# Patient Record
Sex: MALE | Race: WHITE | NOT HISPANIC OR LATINO | Employment: OTHER | URBAN - METROPOLITAN AREA
[De-identification: names, ages, dates, MRNs, and addresses within clinical notes are randomized per-mention and may not be internally consistent; named-entity substitution may affect disease eponyms.]

---

## 2021-03-03 DIAGNOSIS — Z23 NEED FOR VACCINATION: ICD-10-CM

## 2022-09-28 ENCOUNTER — PRE-ADMISSION TESTING (OUTPATIENT)
Dept: ADMISSIONS | Facility: MEDICAL CENTER | Age: 72
End: 2022-09-28
Attending: UROLOGY
Payer: MEDICARE

## 2022-09-28 DIAGNOSIS — Z01.812 PRE-OPERATIVE LABORATORY EXAMINATION: ICD-10-CM

## 2022-09-28 DIAGNOSIS — Z01.810 PRE-OPERATIVE CARDIOVASCULAR EXAMINATION: ICD-10-CM

## 2022-09-28 LAB
ANION GAP SERPL CALC-SCNC: 14 MMOL/L (ref 7–16)
APPEARANCE UR: CLEAR
BACTERIA #/AREA URNS HPF: NEGATIVE /HPF
BASOPHILS # BLD AUTO: 0.3 % (ref 0–1.8)
BASOPHILS # BLD: 0.02 K/UL (ref 0–0.12)
BILIRUB UR QL STRIP.AUTO: NEGATIVE
BUN SERPL-MCNC: 30 MG/DL (ref 8–22)
CALCIUM SERPL-MCNC: 9.1 MG/DL (ref 8.5–10.5)
CHLORIDE SERPL-SCNC: 101 MMOL/L (ref 96–112)
CO2 SERPL-SCNC: 20 MMOL/L (ref 20–33)
COLOR UR: YELLOW
CREAT SERPL-MCNC: 1.07 MG/DL (ref 0.5–1.4)
EKG IMPRESSION: NORMAL
EOSINOPHIL # BLD AUTO: 0.17 K/UL (ref 0–0.51)
EOSINOPHIL NFR BLD: 2.8 % (ref 0–6.9)
EPI CELLS #/AREA URNS HPF: NEGATIVE /HPF
ERYTHROCYTE [DISTWIDTH] IN BLOOD BY AUTOMATED COUNT: 44.6 FL (ref 35.9–50)
GFR SERPLBLD CREATININE-BSD FMLA CKD-EPI: 75 ML/MIN/1.73 M 2
GLUCOSE SERPL-MCNC: 94 MG/DL (ref 65–99)
GLUCOSE UR STRIP.AUTO-MCNC: NEGATIVE MG/DL
HCT VFR BLD AUTO: 47.9 % (ref 42–52)
HGB BLD-MCNC: 16.2 G/DL (ref 14–18)
HYALINE CASTS #/AREA URNS LPF: ABNORMAL /LPF
IMM GRANULOCYTES # BLD AUTO: 0.02 K/UL (ref 0–0.11)
IMM GRANULOCYTES NFR BLD AUTO: 0.3 % (ref 0–0.9)
INR PPP: 0.94 (ref 0.87–1.13)
KETONES UR STRIP.AUTO-MCNC: 40 MG/DL
LEUKOCYTE ESTERASE UR QL STRIP.AUTO: NEGATIVE
LYMPHOCYTES # BLD AUTO: 1.41 K/UL (ref 1–4.8)
LYMPHOCYTES NFR BLD: 23.2 % (ref 22–41)
MCH RBC QN AUTO: 27.7 PG (ref 27–33)
MCHC RBC AUTO-ENTMCNC: 33.8 G/DL (ref 33.7–35.3)
MCV RBC AUTO: 81.9 FL (ref 81.4–97.8)
MICRO URNS: ABNORMAL
MONOCYTES # BLD AUTO: 0.58 K/UL (ref 0–0.85)
MONOCYTES NFR BLD AUTO: 9.5 % (ref 0–13.4)
NEUTROPHILS # BLD AUTO: 3.89 K/UL (ref 1.82–7.42)
NEUTROPHILS NFR BLD: 63.9 % (ref 44–72)
NITRITE UR QL STRIP.AUTO: NEGATIVE
NRBC # BLD AUTO: 0 K/UL
NRBC BLD-RTO: 0 /100 WBC
PH UR STRIP.AUTO: 5.5 [PH] (ref 5–8)
PLATELET # BLD AUTO: 295 K/UL (ref 164–446)
PMV BLD AUTO: 10.3 FL (ref 9–12.9)
POTASSIUM SERPL-SCNC: 4.1 MMOL/L (ref 3.6–5.5)
PROT UR QL STRIP: NEGATIVE MG/DL
PROTHROMBIN TIME: 12.5 SEC (ref 12–14.6)
RBC # BLD AUTO: 5.85 M/UL (ref 4.7–6.1)
RBC # URNS HPF: ABNORMAL /HPF
RBC UR QL AUTO: ABNORMAL
SODIUM SERPL-SCNC: 135 MMOL/L (ref 135–145)
SP GR UR STRIP.AUTO: 1.03
UROBILINOGEN UR STRIP.AUTO-MCNC: 0.2 MG/DL
WBC # BLD AUTO: 6.1 K/UL (ref 4.8–10.8)
WBC #/AREA URNS HPF: ABNORMAL /HPF

## 2022-09-28 PROCEDURE — 93010 ELECTROCARDIOGRAM REPORT: CPT | Performed by: INTERNAL MEDICINE

## 2022-09-28 PROCEDURE — 93005 ELECTROCARDIOGRAM TRACING: CPT

## 2022-09-28 PROCEDURE — 85610 PROTHROMBIN TIME: CPT

## 2022-09-28 PROCEDURE — 87086 URINE CULTURE/COLONY COUNT: CPT

## 2022-09-28 PROCEDURE — 81001 URINALYSIS AUTO W/SCOPE: CPT

## 2022-09-28 PROCEDURE — 80048 BASIC METABOLIC PNL TOTAL CA: CPT

## 2022-09-28 PROCEDURE — 85025 COMPLETE CBC W/AUTO DIFF WBC: CPT

## 2022-09-28 PROCEDURE — 36415 COLL VENOUS BLD VENIPUNCTURE: CPT

## 2022-09-28 RX ORDER — OMEPRAZOLE 20 MG/1
20 CAPSULE, DELAYED RELEASE ORAL DAILY
COMMUNITY

## 2022-09-28 RX ORDER — VALACYCLOVIR HYDROCHLORIDE 500 MG/1
500 TABLET, FILM COATED ORAL 2 TIMES DAILY PRN
COMMUNITY

## 2022-09-28 RX ORDER — LISINOPRIL 10 MG/1
10 TABLET ORAL DAILY
COMMUNITY

## 2022-09-28 RX ORDER — TADALAFIL 10 MG/1
10 TABLET ORAL
COMMUNITY

## 2022-09-28 RX ORDER — TAMSULOSIN HYDROCHLORIDE 0.4 MG/1
0.4 CAPSULE ORAL
COMMUNITY

## 2022-09-28 RX ORDER — MELOXICAM 7.5 MG/1
7.5 TABLET ORAL PRN
COMMUNITY

## 2022-09-30 LAB
BACTERIA UR CULT: NORMAL
SIGNIFICANT IND 70042: NORMAL
SITE SITE: NORMAL
SOURCE SOURCE: NORMAL

## 2022-10-13 ENCOUNTER — ANESTHESIA EVENT (OUTPATIENT)
Dept: SURGERY | Facility: MEDICAL CENTER | Age: 72
End: 2022-10-13
Payer: MEDICARE

## 2022-10-13 ENCOUNTER — ANESTHESIA (OUTPATIENT)
Dept: SURGERY | Facility: MEDICAL CENTER | Age: 72
End: 2022-10-13
Payer: MEDICARE

## 2022-10-13 ENCOUNTER — HOSPITAL ENCOUNTER (OUTPATIENT)
Facility: MEDICAL CENTER | Age: 72
End: 2022-10-14
Attending: UROLOGY | Admitting: UROLOGY
Payer: MEDICARE

## 2022-10-13 ENCOUNTER — APPOINTMENT (OUTPATIENT)
Dept: RADIOLOGY | Facility: MEDICAL CENTER | Age: 72
End: 2022-10-13
Attending: UROLOGY
Payer: MEDICARE

## 2022-10-13 PROBLEM — N40.1 BPH WITH OBSTRUCTION/LOWER URINARY TRACT SYMPTOMS: Status: ACTIVE | Noted: 2022-10-13

## 2022-10-13 PROBLEM — N13.8 BPH WITH OBSTRUCTION/LOWER URINARY TRACT SYMPTOMS: Status: ACTIVE | Noted: 2022-10-13

## 2022-10-13 PROCEDURE — C1894 INTRO/SHEATH, NON-LASER: HCPCS | Performed by: UROLOGY

## 2022-10-13 PROCEDURE — 160002 HCHG RECOVERY MINUTES (STAT): Performed by: UROLOGY

## 2022-10-13 PROCEDURE — 160039 HCHG SURGERY MINUTES - EA ADDL 1 MIN LEVEL 3: Performed by: UROLOGY

## 2022-10-13 PROCEDURE — 160036 HCHG PACU - EA ADDL 30 MINS PHASE I: Performed by: UROLOGY

## 2022-10-13 PROCEDURE — A9270 NON-COVERED ITEM OR SERVICE: HCPCS | Performed by: UROLOGY

## 2022-10-13 PROCEDURE — 700111 HCHG RX REV CODE 636 W/ 250 OVERRIDE (IP): Performed by: UROLOGY

## 2022-10-13 PROCEDURE — C1769 GUIDE WIRE: HCPCS | Performed by: UROLOGY

## 2022-10-13 PROCEDURE — 88305 TISSUE EXAM BY PATHOLOGIST: CPT

## 2022-10-13 PROCEDURE — 700111 HCHG RX REV CODE 636 W/ 250 OVERRIDE (IP): Performed by: ANESTHESIOLOGY

## 2022-10-13 PROCEDURE — 700117 HCHG RX CONTRAST REV CODE 255: Performed by: UROLOGY

## 2022-10-13 PROCEDURE — 160009 HCHG ANES TIME/MIN: Performed by: UROLOGY

## 2022-10-13 PROCEDURE — 160028 HCHG SURGERY MINUTES - 1ST 30 MINS LEVEL 3: Performed by: UROLOGY

## 2022-10-13 PROCEDURE — 88342 IMHCHEM/IMCYTCHM 1ST ANTB: CPT

## 2022-10-13 PROCEDURE — G0378 HOSPITAL OBSERVATION PER HR: HCPCS

## 2022-10-13 PROCEDURE — 700102 HCHG RX REV CODE 250 W/ 637 OVERRIDE(OP): Performed by: UROLOGY

## 2022-10-13 PROCEDURE — 700101 HCHG RX REV CODE 250: Performed by: ANESTHESIOLOGY

## 2022-10-13 PROCEDURE — C1758 CATHETER, URETERAL: HCPCS | Performed by: UROLOGY

## 2022-10-13 PROCEDURE — 88341 IMHCHEM/IMCYTCHM EA ADD ANTB: CPT

## 2022-10-13 PROCEDURE — C2617 STENT, NON-COR, TEM W/O DEL: HCPCS | Performed by: UROLOGY

## 2022-10-13 PROCEDURE — A9270 NON-COVERED ITEM OR SERVICE: HCPCS | Performed by: ANESTHESIOLOGY

## 2022-10-13 PROCEDURE — 00912 ANES TRURL PX RESCJ BLDR TUM: CPT | Performed by: ANESTHESIOLOGY

## 2022-10-13 PROCEDURE — 700101 HCHG RX REV CODE 250: Performed by: UROLOGY

## 2022-10-13 PROCEDURE — 160048 HCHG OR STATISTICAL LEVEL 1-5: Performed by: UROLOGY

## 2022-10-13 PROCEDURE — 160035 HCHG PACU - 1ST 60 MINS PHASE I: Performed by: UROLOGY

## 2022-10-13 PROCEDURE — 96365 THER/PROPH/DIAG IV INF INIT: CPT

## 2022-10-13 PROCEDURE — 700105 HCHG RX REV CODE 258: Performed by: UROLOGY

## 2022-10-13 PROCEDURE — 88300 SURGICAL PATH GROSS: CPT

## 2022-10-13 PROCEDURE — 700102 HCHG RX REV CODE 250 W/ 637 OVERRIDE(OP): Performed by: ANESTHESIOLOGY

## 2022-10-13 DEVICE — STENT UROLOGICAL POLARIS 6X26  ULTRA: Type: IMPLANTABLE DEVICE | Status: FUNCTIONAL

## 2022-10-13 RX ORDER — LIDOCAINE HYDROCHLORIDE 20 MG/ML
INJECTION, SOLUTION EPIDURAL; INFILTRATION; INTRACAUDAL; PERINEURAL PRN
Status: DISCONTINUED | OUTPATIENT
Start: 2022-10-13 | End: 2022-10-13 | Stop reason: SURG

## 2022-10-13 RX ORDER — DIPHENHYDRAMINE HYDROCHLORIDE 50 MG/ML
12.5 INJECTION INTRAMUSCULAR; INTRAVENOUS
Status: DISCONTINUED | OUTPATIENT
Start: 2022-10-13 | End: 2022-10-13 | Stop reason: HOSPADM

## 2022-10-13 RX ORDER — ONDANSETRON 2 MG/ML
4 INJECTION INTRAMUSCULAR; INTRAVENOUS EVERY 4 HOURS PRN
Status: DISCONTINUED | OUTPATIENT
Start: 2022-10-13 | End: 2022-10-14 | Stop reason: HOSPADM

## 2022-10-13 RX ORDER — CEFAZOLIN SODIUM 1 G/50ML
1 INJECTION, SOLUTION INTRAVENOUS EVERY 8 HOURS
Status: COMPLETED | OUTPATIENT
Start: 2022-10-14 | End: 2022-10-14

## 2022-10-13 RX ORDER — ATROPA BELLADONNA AND OPIUM 16.2; 6 MG/1; MG/1
60 SUPPOSITORY RECTAL EVERY 12 HOURS PRN
Status: DISCONTINUED | OUTPATIENT
Start: 2022-10-13 | End: 2022-10-14 | Stop reason: HOSPADM

## 2022-10-13 RX ORDER — DEXAMETHASONE SODIUM PHOSPHATE 4 MG/ML
4 INJECTION, SOLUTION INTRA-ARTICULAR; INTRALESIONAL; INTRAMUSCULAR; INTRAVENOUS; SOFT TISSUE
Status: DISCONTINUED | OUTPATIENT
Start: 2022-10-13 | End: 2022-10-14 | Stop reason: HOSPADM

## 2022-10-13 RX ORDER — ACETAMINOPHEN 500 MG
1000 TABLET ORAL ONCE
Status: COMPLETED | OUTPATIENT
Start: 2022-10-13 | End: 2022-10-13

## 2022-10-13 RX ORDER — OMEPRAZOLE 20 MG/1
20 CAPSULE, DELAYED RELEASE ORAL DAILY
Status: DISCONTINUED | OUTPATIENT
Start: 2022-10-14 | End: 2022-10-14 | Stop reason: HOSPADM

## 2022-10-13 RX ORDER — ALBUTEROL SULFATE 2.5 MG/3ML
2.5 SOLUTION RESPIRATORY (INHALATION)
Status: DISCONTINUED | OUTPATIENT
Start: 2022-10-13 | End: 2022-10-13 | Stop reason: HOSPADM

## 2022-10-13 RX ORDER — MAGNESIUM HYDROXIDE 1200 MG/15ML
LIQUID ORAL
Status: COMPLETED | OUTPATIENT
Start: 2022-10-13 | End: 2022-10-13

## 2022-10-13 RX ORDER — ATROPA BELLADONNA AND OPIUM 16.2; 6 MG/1; MG/1
SUPPOSITORY RECTAL
Status: DISCONTINUED | OUTPATIENT
Start: 2022-10-13 | End: 2022-10-13 | Stop reason: HOSPADM

## 2022-10-13 RX ORDER — KETOROLAC TROMETHAMINE 30 MG/ML
INJECTION, SOLUTION INTRAMUSCULAR; INTRAVENOUS PRN
Status: DISCONTINUED | OUTPATIENT
Start: 2022-10-13 | End: 2022-10-13 | Stop reason: HOSPADM

## 2022-10-13 RX ORDER — LISINOPRIL 10 MG/1
10 TABLET ORAL DAILY
Status: DISCONTINUED | OUTPATIENT
Start: 2022-10-14 | End: 2022-10-14 | Stop reason: HOSPADM

## 2022-10-13 RX ORDER — HYDROMORPHONE HYDROCHLORIDE 1 MG/ML
0.1 INJECTION, SOLUTION INTRAMUSCULAR; INTRAVENOUS; SUBCUTANEOUS
Status: DISCONTINUED | OUTPATIENT
Start: 2022-10-13 | End: 2022-10-13 | Stop reason: HOSPADM

## 2022-10-13 RX ORDER — SODIUM CHLORIDE, SODIUM LACTATE, POTASSIUM CHLORIDE, CALCIUM CHLORIDE 600; 310; 30; 20 MG/100ML; MG/100ML; MG/100ML; MG/100ML
INJECTION, SOLUTION INTRAVENOUS CONTINUOUS
Status: DISCONTINUED | OUTPATIENT
Start: 2022-10-13 | End: 2022-10-13 | Stop reason: HOSPADM

## 2022-10-13 RX ORDER — ACETAMINOPHEN 500 MG
1000 TABLET ORAL ONCE
Status: DISCONTINUED | OUTPATIENT
Start: 2022-10-13 | End: 2022-10-13 | Stop reason: HOSPADM

## 2022-10-13 RX ORDER — HYDROMORPHONE HYDROCHLORIDE 1 MG/ML
0.25 INJECTION, SOLUTION INTRAMUSCULAR; INTRAVENOUS; SUBCUTANEOUS
Status: DISCONTINUED | OUTPATIENT
Start: 2022-10-13 | End: 2022-10-14 | Stop reason: HOSPADM

## 2022-10-13 RX ORDER — ONDANSETRON 2 MG/ML
4 INJECTION INTRAMUSCULAR; INTRAVENOUS
Status: DISCONTINUED | OUTPATIENT
Start: 2022-10-13 | End: 2022-10-13 | Stop reason: HOSPADM

## 2022-10-13 RX ORDER — ONDANSETRON 2 MG/ML
INJECTION INTRAMUSCULAR; INTRAVENOUS PRN
Status: DISCONTINUED | OUTPATIENT
Start: 2022-10-13 | End: 2022-10-13 | Stop reason: SURG

## 2022-10-13 RX ORDER — ATROPA BELLADONNA AND OPIUM 16.2; 6 MG/1; MG/1
SUPPOSITORY RECTAL
Status: DISCONTINUED | OUTPATIENT
Start: 2022-10-13 | End: 2022-10-13

## 2022-10-13 RX ORDER — GLYCOPYRROLATE 0.2 MG/ML
INJECTION INTRAMUSCULAR; INTRAVENOUS PRN
Status: DISCONTINUED | OUTPATIENT
Start: 2022-10-13 | End: 2022-10-13 | Stop reason: SURG

## 2022-10-13 RX ORDER — OXYCODONE HYDROCHLORIDE 5 MG/1
5 TABLET ORAL
Status: DISCONTINUED | OUTPATIENT
Start: 2022-10-13 | End: 2022-10-14 | Stop reason: HOSPADM

## 2022-10-13 RX ORDER — HALOPERIDOL 5 MG/ML
1 INJECTION INTRAMUSCULAR EVERY 6 HOURS PRN
Status: DISCONTINUED | OUTPATIENT
Start: 2022-10-13 | End: 2022-10-14 | Stop reason: HOSPADM

## 2022-10-13 RX ORDER — CEFAZOLIN SODIUM 1 G/3ML
INJECTION, POWDER, FOR SOLUTION INTRAMUSCULAR; INTRAVENOUS PRN
Status: DISCONTINUED | OUTPATIENT
Start: 2022-10-13 | End: 2022-10-13 | Stop reason: SURG

## 2022-10-13 RX ORDER — HYDROMORPHONE HYDROCHLORIDE 1 MG/ML
0.4 INJECTION, SOLUTION INTRAMUSCULAR; INTRAVENOUS; SUBCUTANEOUS
Status: DISCONTINUED | OUTPATIENT
Start: 2022-10-13 | End: 2022-10-13 | Stop reason: HOSPADM

## 2022-10-13 RX ORDER — AMOXICILLIN 250 MG
2 CAPSULE ORAL
Status: DISCONTINUED | OUTPATIENT
Start: 2022-10-13 | End: 2022-10-14 | Stop reason: HOSPADM

## 2022-10-13 RX ORDER — SODIUM CHLORIDE, SODIUM LACTATE, POTASSIUM CHLORIDE, CALCIUM CHLORIDE 600; 310; 30; 20 MG/100ML; MG/100ML; MG/100ML; MG/100ML
INJECTION, SOLUTION INTRAVENOUS CONTINUOUS
Status: ACTIVE | OUTPATIENT
Start: 2022-10-13 | End: 2022-10-13

## 2022-10-13 RX ORDER — DIPHENHYDRAMINE HYDROCHLORIDE 50 MG/ML
25 INJECTION INTRAMUSCULAR; INTRAVENOUS EVERY 6 HOURS PRN
Status: DISCONTINUED | OUTPATIENT
Start: 2022-10-13 | End: 2022-10-14 | Stop reason: HOSPADM

## 2022-10-13 RX ORDER — DEXAMETHASONE SODIUM PHOSPHATE 4 MG/ML
INJECTION, SOLUTION INTRA-ARTICULAR; INTRALESIONAL; INTRAMUSCULAR; INTRAVENOUS; SOFT TISSUE PRN
Status: DISCONTINUED | OUTPATIENT
Start: 2022-10-13 | End: 2022-10-13 | Stop reason: SURG

## 2022-10-13 RX ORDER — ACETAMINOPHEN 500 MG
1000 TABLET ORAL EVERY 6 HOURS PRN
Status: DISCONTINUED | OUTPATIENT
Start: 2022-10-18 | End: 2022-10-14 | Stop reason: HOSPADM

## 2022-10-13 RX ORDER — HYDRALAZINE HYDROCHLORIDE 20 MG/ML
5 INJECTION INTRAMUSCULAR; INTRAVENOUS
Status: DISCONTINUED | OUTPATIENT
Start: 2022-10-13 | End: 2022-10-13 | Stop reason: HOSPADM

## 2022-10-13 RX ORDER — OXYCODONE HYDROCHLORIDE 5 MG/1
2.5 TABLET ORAL
Status: DISCONTINUED | OUTPATIENT
Start: 2022-10-13 | End: 2022-10-14 | Stop reason: HOSPADM

## 2022-10-13 RX ORDER — PHENAZOPYRIDINE HYDROCHLORIDE 200 MG/1
200 TABLET, FILM COATED ORAL
Status: DISCONTINUED | OUTPATIENT
Start: 2022-10-14 | End: 2022-10-14 | Stop reason: HOSPADM

## 2022-10-13 RX ORDER — SODIUM CHLORIDE 9 MG/ML
INJECTION, SOLUTION INTRAVENOUS CONTINUOUS
Status: ACTIVE | OUTPATIENT
Start: 2022-10-13 | End: 2022-10-14

## 2022-10-13 RX ORDER — LABETALOL HYDROCHLORIDE 5 MG/ML
5 INJECTION, SOLUTION INTRAVENOUS
Status: DISCONTINUED | OUTPATIENT
Start: 2022-10-13 | End: 2022-10-13 | Stop reason: HOSPADM

## 2022-10-13 RX ORDER — HALOPERIDOL 5 MG/ML
1 INJECTION INTRAMUSCULAR
Status: DISCONTINUED | OUTPATIENT
Start: 2022-10-13 | End: 2022-10-13 | Stop reason: HOSPADM

## 2022-10-13 RX ORDER — ATROPA BELLADONNA AND OPIUM 16.2; 6 MG/1; MG/1
1 SUPPOSITORY RECTAL
Status: DISCONTINUED | OUTPATIENT
Start: 2022-10-13 | End: 2022-10-13

## 2022-10-13 RX ORDER — HYDROMORPHONE HYDROCHLORIDE 1 MG/ML
0.2 INJECTION, SOLUTION INTRAMUSCULAR; INTRAVENOUS; SUBCUTANEOUS
Status: DISCONTINUED | OUTPATIENT
Start: 2022-10-13 | End: 2022-10-13 | Stop reason: HOSPADM

## 2022-10-13 RX ORDER — SIMETHICONE 125 MG
125 TABLET,CHEWABLE ORAL 3 TIMES DAILY PRN
Status: DISCONTINUED | OUTPATIENT
Start: 2022-10-13 | End: 2022-10-14 | Stop reason: HOSPADM

## 2022-10-13 RX ORDER — SCOLOPAMINE TRANSDERMAL SYSTEM 1 MG/1
1 PATCH, EXTENDED RELEASE TRANSDERMAL
Status: DISCONTINUED | OUTPATIENT
Start: 2022-10-13 | End: 2022-10-14 | Stop reason: HOSPADM

## 2022-10-13 RX ORDER — ACETAMINOPHEN 325 MG/1
650 TABLET ORAL EVERY 6 HOURS PRN
Status: DISCONTINUED | OUTPATIENT
Start: 2022-10-13 | End: 2022-10-14 | Stop reason: HOSPADM

## 2022-10-13 RX ORDER — HYDROXYZINE HYDROCHLORIDE 25 MG/1
25 TABLET, FILM COATED ORAL NIGHTLY PRN
Status: DISCONTINUED | OUTPATIENT
Start: 2022-10-13 | End: 2022-10-14 | Stop reason: HOSPADM

## 2022-10-13 RX ADMIN — GLYCOPYRROLATE 0.3 MG: 0.2 INJECTION INTRAMUSCULAR; INTRAVENOUS at 15:58

## 2022-10-13 RX ADMIN — FENTANYL CITRATE 50 MCG: 50 INJECTION, SOLUTION INTRAMUSCULAR; INTRAVENOUS at 16:25

## 2022-10-13 RX ADMIN — FENTANYL CITRATE 50 MCG: 50 INJECTION, SOLUTION INTRAMUSCULAR; INTRAVENOUS at 15:52

## 2022-10-13 RX ADMIN — DEXAMETHASONE SODIUM PHOSPHATE 8 MG: 4 INJECTION, SOLUTION INTRA-ARTICULAR; INTRALESIONAL; INTRAMUSCULAR; INTRAVENOUS; SOFT TISSUE at 15:31

## 2022-10-13 RX ADMIN — LIDOCAINE HYDROCHLORIDE 50 MG: 20 INJECTION, SOLUTION EPIDURAL; INFILTRATION; INTRACAUDAL at 15:30

## 2022-10-13 RX ADMIN — KETOROLAC TROMETHAMINE 15 MG: 30 INJECTION, SOLUTION INTRAMUSCULAR at 17:16

## 2022-10-13 RX ADMIN — FENTANYL CITRATE 50 MCG: 50 INJECTION, SOLUTION INTRAMUSCULAR; INTRAVENOUS at 15:30

## 2022-10-13 RX ADMIN — SENNOSIDES AND DOCUSATE SODIUM 2 TABLET: 50; 8.6 TABLET ORAL at 21:20

## 2022-10-13 RX ADMIN — CEFAZOLIN 2 G: 330 INJECTION, POWDER, FOR SOLUTION INTRAMUSCULAR; INTRAVENOUS at 15:31

## 2022-10-13 RX ADMIN — PROPOFOL 150 MG: 10 INJECTION, EMULSION INTRAVENOUS at 15:30

## 2022-10-13 RX ADMIN — FENTANYL CITRATE 50 MCG: 50 INJECTION, SOLUTION INTRAMUSCULAR; INTRAVENOUS at 17:12

## 2022-10-13 RX ADMIN — SODIUM CHLORIDE: 9 INJECTION, SOLUTION INTRAVENOUS at 21:20

## 2022-10-13 RX ADMIN — ACETAMINOPHEN 1000 MG: 500 TABLET ORAL at 14:52

## 2022-10-13 RX ADMIN — SODIUM CHLORIDE, POTASSIUM CHLORIDE, SODIUM LACTATE AND CALCIUM CHLORIDE: 600; 310; 30; 20 INJECTION, SOLUTION INTRAVENOUS at 14:47

## 2022-10-13 RX ADMIN — CEFAZOLIN SODIUM 1 G: 1 INJECTION, SOLUTION INTRAVENOUS at 23:33

## 2022-10-13 RX ADMIN — ONDANSETRON 4 MG: 2 INJECTION INTRAMUSCULAR; INTRAVENOUS at 17:16

## 2022-10-13 RX ADMIN — FENTANYL CITRATE 50 MCG: 50 INJECTION, SOLUTION INTRAMUSCULAR; INTRAVENOUS at 17:03

## 2022-10-13 ASSESSMENT — COGNITIVE AND FUNCTIONAL STATUS - GENERAL
CLIMB 3 TO 5 STEPS WITH RAILING: A LITTLE
SUGGESTED CMS G CODE MODIFIER MOBILITY: CJ
TOILETING: A LITTLE
STANDING UP FROM CHAIR USING ARMS: A LITTLE
MOVING FROM LYING ON BACK TO SITTING ON SIDE OF FLAT BED: A LITTLE
WALKING IN HOSPITAL ROOM: A LITTLE
DAILY ACTIVITIY SCORE: 23
SUGGESTED CMS G CODE MODIFIER DAILY ACTIVITY: CI
MOBILITY SCORE: 20

## 2022-10-13 ASSESSMENT — PAIN SCALES - GENERAL: PAIN_LEVEL: 0

## 2022-10-13 ASSESSMENT — PATIENT HEALTH QUESTIONNAIRE - PHQ9
2. FEELING DOWN, DEPRESSED, IRRITABLE, OR HOPELESS: NOT AT ALL
SUM OF ALL RESPONSES TO PHQ9 QUESTIONS 1 AND 2: 0
1. LITTLE INTEREST OR PLEASURE IN DOING THINGS: NOT AT ALL

## 2022-10-13 ASSESSMENT — PAIN DESCRIPTION - PAIN TYPE
TYPE: SURGICAL PAIN
TYPE: SURGICAL PAIN;REFERRED PAIN
TYPE: SURGICAL PAIN
TYPE: ACUTE PAIN
TYPE: SURGICAL PAIN

## 2022-10-13 NOTE — ANESTHESIA PROCEDURE NOTES
Airway    Date/Time: 10/13/2022 3:30 PM  Performed by: Elder Weinstein M.D.  Authorized by: Elder Weinstein M.D.     Location:  OR  Urgency:  Elective  Indications for Airway Management:  Anesthesia      Spontaneous Ventilation: absent    Sedation Level:  Deep  Preoxygenated: Yes    Final Airway Type:  Supraglottic airway  Final Supraglottic Airway:  Standard LMA    SGA Size:  4  Number of Attempts at Approach:  1

## 2022-10-13 NOTE — ANESTHESIA PREPROCEDURE EVALUATION
Case: [id number] Date/Time: 10/13/22 1545    Procedures:       CYSTOLITHOLAPAXY WITH BIPOLAR TRANSURETHRAL RESECTION OF PROSTATE AND CYSTOSCOPY WITH RIGHT URETEROSCOPY WITH LITHOTRIPSY WITH INSERTION OF RIGHT URETERAL STENT      LITHOTRIPSY, USING LASER      TURP, USING BUTTON ELECTRODE      URETEROSCOPY    Pre-op diagnosis: URINARY BLADDER STONE    Location: [facility] OR 18 / SURGERY [facility]    Surgeons: Yusuf Hernández M.D.          Relevant Problems   ANESTHESIA (within normal limits)      PULMONARY (within normal limits)      NEURO (within normal limits)      CARDIAC   (positive) Hypertension      GI (within normal limits)       (within normal limits)      ENDO (within normal limits)       Physical Exam    Airway   Mallampati: II  TM distance: >3 FB  Neck ROM: full       Cardiovascular - normal exam  Rhythm: regular  Rate: normal  (-) murmur     Dental - normal exam           Pulmonary - normal exam  Breath sounds clear to auscultation     Abdominal    Neurological - normal exam                 Anesthesia Plan    ASA 2       Plan - general       Airway plan will be LMA          Induction: intravenous    Postoperative Plan: Postoperative administration of opioids is intended.    Pertinent diagnostic labs and testing reviewed    Informed Consent:    Anesthetic plan and risks discussed with patient.    Use of blood products discussed with: patient whom consented to blood products.

## 2022-10-14 VITALS
TEMPERATURE: 97.7 F | RESPIRATION RATE: 18 BRPM | BODY MASS INDEX: 23.76 KG/M2 | SYSTOLIC BLOOD PRESSURE: 126 MMHG | DIASTOLIC BLOOD PRESSURE: 81 MMHG | HEART RATE: 48 BPM | OXYGEN SATURATION: 100 % | HEIGHT: 76 IN | WEIGHT: 195.11 LBS

## 2022-10-14 PROBLEM — N21.0 CALCULUS IN BLADDER: Status: ACTIVE | Noted: 2022-10-14

## 2022-10-14 LAB
ANION GAP SERPL CALC-SCNC: 16 MMOL/L (ref 7–16)
BUN SERPL-MCNC: 27 MG/DL (ref 8–22)
CALCIUM SERPL-MCNC: 9.1 MG/DL (ref 8.5–10.5)
CHLORIDE SERPL-SCNC: 96 MMOL/L (ref 96–112)
CO2 SERPL-SCNC: 20 MMOL/L (ref 20–33)
CREAT SERPL-MCNC: 1.4 MG/DL (ref 0.5–1.4)
ERYTHROCYTE [DISTWIDTH] IN BLOOD BY AUTOMATED COUNT: 47.6 FL (ref 35.9–50)
GFR SERPLBLD CREATININE-BSD FMLA CKD-EPI: 54 ML/MIN/1.73 M 2
GLUCOSE SERPL-MCNC: 109 MG/DL (ref 65–99)
HCT VFR BLD AUTO: 48.8 % (ref 42–52)
HGB BLD-MCNC: 16 G/DL (ref 14–18)
MCH RBC QN AUTO: 27.9 PG (ref 27–33)
MCHC RBC AUTO-ENTMCNC: 32.8 G/DL (ref 33.7–35.3)
MCV RBC AUTO: 85 FL (ref 81.4–97.8)
PLATELET # BLD AUTO: 298 K/UL (ref 164–446)
PMV BLD AUTO: 9.7 FL (ref 9–12.9)
POTASSIUM SERPL-SCNC: 5.1 MMOL/L (ref 3.6–5.5)
RBC # BLD AUTO: 5.74 M/UL (ref 4.7–6.1)
SODIUM SERPL-SCNC: 132 MMOL/L (ref 135–145)
WBC # BLD AUTO: 7.4 K/UL (ref 4.8–10.8)

## 2022-10-14 PROCEDURE — A9270 NON-COVERED ITEM OR SERVICE: HCPCS | Performed by: UROLOGY

## 2022-10-14 PROCEDURE — 85027 COMPLETE CBC AUTOMATED: CPT

## 2022-10-14 PROCEDURE — 36415 COLL VENOUS BLD VENIPUNCTURE: CPT

## 2022-10-14 PROCEDURE — 96366 THER/PROPH/DIAG IV INF ADDON: CPT

## 2022-10-14 PROCEDURE — 80048 BASIC METABOLIC PNL TOTAL CA: CPT

## 2022-10-14 PROCEDURE — 700102 HCHG RX REV CODE 250 W/ 637 OVERRIDE(OP): Performed by: UROLOGY

## 2022-10-14 PROCEDURE — G0378 HOSPITAL OBSERVATION PER HR: HCPCS

## 2022-10-14 PROCEDURE — 700111 HCHG RX REV CODE 636 W/ 250 OVERRIDE (IP): Performed by: UROLOGY

## 2022-10-14 RX ADMIN — LISINOPRIL 10 MG: 10 TABLET ORAL at 05:36

## 2022-10-14 RX ADMIN — CEFAZOLIN SODIUM 1 G: 1 INJECTION, SOLUTION INTRAVENOUS at 05:36

## 2022-10-14 RX ADMIN — PHENAZOPYRIDINE HYDROCHLORIDE 200 MG: 200 TABLET ORAL at 18:02

## 2022-10-14 RX ADMIN — PHENAZOPYRIDINE HYDROCHLORIDE 200 MG: 200 TABLET ORAL at 08:50

## 2022-10-14 RX ADMIN — OMEPRAZOLE 20 MG: 20 CAPSULE, DELAYED RELEASE ORAL at 05:36

## 2022-10-14 RX ADMIN — PHENAZOPYRIDINE HYDROCHLORIDE 200 MG: 200 TABLET ORAL at 18:07

## 2022-10-14 RX ADMIN — PHENAZOPYRIDINE HYDROCHLORIDE 200 MG: 200 TABLET ORAL at 12:53

## 2022-10-14 NOTE — ANESTHESIA POSTPROCEDURE EVALUATION
Patient: Yusuf Smallwood    Procedure Summary     Date: 10/13/22 Room / Location: [place] [address] / SURGERY [facility]    Anesthesia Start: 1525 Anesthesia Stop: 1728    Procedures:       CYSTOLITHOLAPAXY WITH BIPOLAR TRANSURETHRAL RESECTION OF PROSTATE AND CYSTOSCOPY WITH RIGHT URETEROSCOPY WITH LITHOTRIPSY WITH INSERTION OF RIGHT URETERAL STENT (Right: Urethra)      LITHOTRIPSY, USING LASER (Right: Ureter)      TURP, USING BUTTON ELECTRODE (Urethra)      URETEROSCOPY (Right: Ureter) Diagnosis: (URINARY BLADDER STONE)    Surgeons: Yusuf Hernández M.D. Responsible Provider: Elder Weinstein M.D.    Anesthesia Type: general ASA Status: 2          Final Anesthesia Type: general  Last vitals  BP   Blood Pressure : 136/78    Temp   35.8 °C (96.5 °F)    Pulse   (!) 52   Resp   16    SpO2   96 %      Anesthesia Post Evaluation    Patient location during evaluation: PACU  Patient participation: complete - patient participated  Level of consciousness: awake and alert  Pain score: 0    Airway patency: patent  Anesthetic complications: no  Cardiovascular status: hemodynamically stable  Respiratory status: acceptable  Hydration status: euvolemic    PONV: none          No notable events documented.     Nurse Pain Score: 0 (NPRS)

## 2022-10-14 NOTE — OP REPORT
[facility] Operative Report    Pre-operative Diagnosis: 1.  BPH    2.  Bladder calculus    3.  Possible right renal calculus   Post-operative Diagnosis: 1.  BPH    2.  Bladder calculus    3.  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  Transurethral resection of prostate    2.  Cystolitholopaxy (laser), 1 cm bladder stone    3.  Right ureteroscopy with right ureteral stent placement   Surgeon: Yusuf Hernández M.D., MD   Assistant: None   Anesthesia: General  Anesthesiologist: Elder Weinstein M.D.    Estimated Blood Loss: Approximately 100 cc   IV fluids ? cc crystalloid   Specimens: 1.  Bladder calculus    2.  Prostate chips   Drains: 1.  6 Ivorian x26 cm double-J ureteral stent (no string)    2.  20 Ivorian three-way Castillo catheter with normal saline continuous bladder irrigation   Complications: None   Wound class Clean contaminated   Condition: Stable, procedure well tolerated    Disposition:  Stable to PACU    Findings: 1.  Marked BPH    2.  1 cm bladder calculus, fragmented with laser and remove    3.  No evidence of renal calculus although each calyx had multiple Turner's plaques; right proximal ureteral medial wall thinned with dilation from access catheter so ureteral stent left in place (no string)     Indications for Procedure:  Yusuf Smallwood is a 71 y.o. male with symptomatic BPH on medical therapy, bladder calculus, and concern on renal ultrasound of a 5 mm renal calculus.  Renal ultrasound was performed at [facility].  He is taken now for definitive treatment    Procedure in Detail:  The patient was identified in the holding area.  He was taken to the operative suite.  General anesthesia was administered by Dr. Weinstein.  He was positioned in the dorsal lithotomy position and sterilely prepped and draped.  Timeout was called.  Correct patient and site of surgery was confirmed.  The operating team agreed.  Cefazolin was given  intravenously for antibiotic prophylaxis.    The urethral meatus and distal urethra was dilated with sequential male dilating sounds from 22 Italian to 30 Italian.  A 22 Italian cystoscope was introduced.  No urethral stricturing was noted.  Enlarged prostate with an elevated median bar was noted.  Upon entering the bladder a lees yellow bladder calculus was in the dependent portion of the bladder.  Ureteral orifice ease were close to the enlarged prostate.  No foreign bodies or tumors were noted within the bladder.    A 6 Italian open-ended catheter was seated into the right ureteral orifice and retrograde pyelogram outlining normal-appearing right ureter and kidney.  There was some tortuosity to the proximal right ureter.  No stone was seen.    A sensor wire was advanced into the right kidney.  An 11/13 ureteral access sheath system ([employer]) was advanced over the wire.  Initially I dilated to the mid ureter with the obturator then obturator and sheath.    A flexible disposable [employer] ureteroscope was then advanced.  The proximal ureter showed no stones.  I outlined the collecting system again with contrast and inspected each of the calyces.  No stone was seen.  Turner's plaques were noted on the renal pyramids in each of the calyceal systems.  Each calyx was inspected twice and all the calyces were confirmed to have been inspected using contrast outlining the collecting system.    I then withdrew the ureteroscope and sheath in tandem inspecting the ureter.  Approximately 4 to 5 cm below the right UPJ the medial wall of the ureter was then from dilation.  I readvanced the ureteroscope up into the kidney and advanced a sensor wire through this leaving the sensor wire in the collecting system.  I then withdrew the ureteroscope inspecting the ureter and no stone or other injury was noted.    The guidewire was then backloaded onto a cystoscope and a 6 Italian by 26 cm double-J stent was advanced  coiling in the renal pelvis proximally in the bladder distally.  No string was left attached.  I will plan to leave this for approximately 3 weeks to let the ureteral wall heal.    I then used a 500 µm holmium laser fiber and fragmented the bladder calculus using energy settings of 1 J and 10 Hz.  Stone fragments were collected through the sheath and submitted for pathologic analysis.    Next I approached the transurethral resection of the prostate portion of the procedure. A 26 Spanish continuous-flow resectoscope with visual obturator was advanced into the urethral meatus.  Direct inspection of the urethra showed no stricturing.  The prostate was inspected.  Trilobar hypertrophy was evident.  Ureteral orifices were intact and in orthotopic position.  The right ureteral stent was in place within the right ureteral orifice.    The visual obturator was removed and a bipolar cautery resectoscope was introduced.  Resection using a loop electrode was begun at the bladder neck and continued to the verumontanum circumferentially, initially resecting the left lobe of the prostate, then the floor the prostate and finally the right lobe of the prostate.  This was a large prostate and it took approximately an hour to resect the entirety of the prostate.  No obvious capsular perforations were noted.    Hemostasis was achieved using the electrocautery loop.    TUR chips within the bladder were collected using an Biodesy evacuator.  These were submitted for pathologic analysis.  Reinspection of the prostatic fossa showed minimal venous bleeding.  No arterial bleeding was identified.  Venous sites were coagulated.  The resectoscope was removed.    A 20 Spanish three-way catheter was then easily  passed into the bladder using a catheter guide.  This was inflated with 25 cc of sterile water in the balloon port.  Catheter irrigated freely.  It was connected to normal saline continuous irrigation.      A 60 mg belladonna and opiate  suppository was placed rectally.  The procedure was terminated.  The patient was sent to recovery room in stable condition.  He suffered no obvious intraoperative complications.          Yusuf Hernández M.D.   [address] [place] [address]   [phone]

## 2022-10-14 NOTE — DISCHARGE SUMMARY
[facility] Discharge Summary                                                                       Attending Physician:  Yusuf Hernández M.D.    Admission Date: 10/13/2022   Discharge Date: 10/14/2022    Admission Diagnosis: Bladder outlet obstruction, BPH, bladder stones   Discharge Diagnosis: Bladder Outlet Obstruction, BPH, Bladder stones   Complications: none   Disposition: Home   Condition of patient: good   Discharge Diagnoses Principal Problem:    BPH with obstruction/lower urinary tract symptoms POA: Yes  Active Problems:    Calculus in bladder POA: Unknown  Resolved Problems:    * No resolved hospital problems. *     Comorbidities:  has a past medical history of Arthritis, Heart burn, Hypertension, and Macular degeneration.    He has no past medical history of Anesthesia, Asthma, Blood clotting disorder (HCC), Bowel habit changes, Breath shortness, Cancer (HCC), Cataract, Dental disorder, Diabetes (HCC), Disorder of thyroid, Glaucoma, Hemorrhagic disorder (HCC), Hepatitis A, Hepatitis B, Hepatitis C, High cholesterol, Myocardial infarct (HCC), Psychiatric problem, Renal disorder, Seizure (HCC), Sleep apnea, Snoring, Stroke (HCC), Tuberculosis, or Urinary incontinence.     Patient ID:   Yusuf Smallwood is a 71 y.o. male with history of lower urinary tract symptoms refractory to medications.  After a full discussion of alternatives, risks, and benefits the patient consented to proceeding with Transurethral resection of prostate, Cystolitholopaxy (laser), 1 cm bladder stone, Right ureteroscopy with right ureteral stent placement. He was admitted on 10/13/2022 for observation overnight post-operatively.  Please see operative note for details.    Reason for Hospitalization: Surgical procedure as per above  Hospital Course/Significant Findings: The patient was admitted the day of the procedure which was well tolerated and without complications, and he was transferred to the floor in stable  condition. His post-operative course was unremarkable.  He had continuous bladder irrigation running overnight, and by the morning it was light pink.  His bladder was filled and he voided.  Subsequent voids showed urine adequately light in color and PVR's <  9cc of urine.  He was thus discharged without a catheter.  He began tolerating a regular diet immediately.  He achieved adequate pain control promptly with prn po analgesics.    By discharge on POD1 the patient was ambulating, tolerating a regular diet, his pain was well controlled with oral medications, he was hemodynamically stable, was passing flatus and gas, he was voiding easily per urethra, and had been afebrile for more than 24 hours.  Physical exam was notable for no fever, stable vital signs, light urine with no significant clots,  and was otherwise unremarkable.    CONSULTATIONS  None     Labs:  Lab Results   Component Value Date/Time    WBC 7.4 10/14/2022 03:09 AM    HEMATOCRIT 48.8 10/14/2022 03:09 AM    SODIUM 132 (L) 10/14/2022 03:09 AM    POTASSIUM 5.1 10/14/2022 03:09 AM    CHLORIDE 96 10/14/2022 03:09 AM    CO2 20 10/14/2022 03:09 AM    BUN 27 (H) 10/14/2022 03:09 AM    CREATININE 1.40 10/14/2022 03:09 AM    CALCIUM 9.1 10/14/2022 03:09 AM         Discharge Medications:     Medication List        CONTINUE taking these medications        Instructions   lisinopril 10 MG Tabs  Commonly known as: PRINIVIL   Take 10 mg by mouth every day.  Dose: 10 mg     meloxicam 7.5 MG Tabs  Commonly known as: MOBIC   Take 7.5 mg by mouth as needed.  Dose: 7.5 mg     omeprazole 20 MG delayed-release capsule  Commonly known as: PRILOSEC   Take 20 mg by mouth every day.  Dose: 20 mg     ORAL ELECTROLYTES PO   Take 1 Packet by mouth every day.  Dose: 1 Packet     tadalafil 10 MG tablet  Commonly known as: CIALIS   Take 10 mg by mouth 1 time a day as needed (To help shrink prostate.).  Dose: 10 mg     tamsulosin 0.4 MG capsule  Commonly known as: FLOMAX   Take 0.4 mg  by mouth 1/2 hour after breakfast.  Dose: 0.4 mg     valACYclovir 500 MG Tabs  Commonly known as: VALTREX   Take 500 mg by mouth 2 times a day as needed.  Dose: 500 mg               Specific outpatient follow up  The patient was discharged without a catheter and we will therefore have him follow up in clinic in 2wks for cysto stent removal with Dr. Hernández. Discussed potential sequela from stent in place. Pt gave verbal understanding.     ACTIVITY  Light duty and as tolerated.  No strenuous exercise      Appointments Scheduled in the Next 30 Days:  Our office with contact pt to arrange f/u    Total time of the discharge process exceeds 36 minutes    Carolina Staley PA-C  [facility]

## 2022-10-14 NOTE — OR SURGEON
Immediate Post OP Note    PreOp Diagnosis:    1.  BPH    2.  Bladder calculus    3.  Right renal calculus      PostOp Diagnosis:    1.  BPH    2.  Bladder calculus    3.  No evidence of right renal calculus; multiple Turner's plaques present      Procedure(s):  CYSTOLITHOLAPAXY WITH BIPOLAR TRANSURETHRAL RESECTION OF PROSTATE AND CYSTOSCOPY WITH RIGHT URETEROSCOPY WITH LITHOTRIPSY WITH INSERTION OF RIGHT URETERAL STENT - Wound Class: Clean Contaminated  LITHOTRIPSY, USING LASER - Wound Class: Clean Contaminated  TURP, USING BUTTON ELECTRODE - Wound Class: Clean Contaminated  URETEROSCOPY - Wound Class: Clean Contaminated    Surgeon(s):  Yusuf Hernández M.D.    Anesthesiologist/Type of Anesthesia:  Anesthesiologist: Elder Weinstein M.D./General    Surgical Staff:  Circulator: Nancy Nolen R.N.  Laser Staff: Bjorn Aguilar  Limb Peters: Primo Rodriguez R.N.  Relief Circulator: Huma Rivera R.N.  Relief Scrub: Beto Gipson R.N.  Scrub Person: Michael Lucio    Specimens removed if any:  ID Type Source Tests Collected by Time Destination   A : A) bladder stone Tissue Bladder PATHOLOGY SPECIMEN Yusuf Hernández M.D. 10/13/2022 1602    B : B) prostate chips Tissue Prostate PATHOLOGY SPECIMEN Yusuf Hernández M.D. 10/13/2022 1602        Estimated Blood Loss: Approximately 100 cc    Findings:    1.  No evidence of right renal calculus although there were multiple Turner's plaques    2.  Bladder calculus, removed    3.  BPH, resected    Complications: None        10/13/2022 5:43 PM Yusuf Hernández M.D.

## 2022-10-14 NOTE — PROGRESS NOTES
Pt arrived on the unit. Pt is A&Ox4. Pt is in bed. Patient is on room air. Pt was updated on plan of care. Pt has call light, personal belongings, and bedside table within reach. Bed locked and in lowest position. Pt has Continuous Bladder Irrigation going.

## 2022-10-14 NOTE — ANESTHESIA TIME REPORT
Anesthesia Start and Stop Event Times     Date Time Event    10/13/2022 1516 Ready for Procedure     1525 Anesthesia Start     1728 Anesthesia Stop        Responsible Staff  10/13/22    Name Role Begin End    Elder Weinstein M.D. Anesth 1525 1728        Overtime Reason:  no overtime (within assigned shift)    Comments:

## 2022-10-14 NOTE — PROGRESS NOTES
4 Eyes Skin Assessment Completed by MANUELA De Leon and MANUELA Bernardo.    Head WDL  Ears WDL  Nose WDL  Mouth WDL  Neck WDL  Breast/Chest Redness  Shoulder Blades WDL  Spine WDL  (R) Arm/Elbow/Hand WDL  (L) Arm/Elbow/Hand WDL  Abdomen WDL  Groin WDL  Scrotum/Coccyx/Buttocks WDL  (R) Leg WDL  (L) Leg Redness and Abrasion  (R) Heel/Foot/Toe WDL  (L) Heel/Foot/Toe WDL          Devices In Places: Castillo      Interventions In Place Pillows    Possible Skin Injury No    Pictures Uploaded Into Epic N/A  Wound Consult Placed N/A  RN Wound Prevention Protocol Ordered No

## 2022-10-14 NOTE — OR NURSING
Arrived to PACU in stable condition. 3 way porras in place and bladder irrigation is going. Patient with oral airway in place on arrival and removed within 30 min. Patient is doing great and denies pain or nausea. Taking po well and resting. Belongings brought to bedside. Urine is watermelon color and irrigation is on high. He is stable and ready to transfer to his room.

## 2022-10-15 NOTE — PROGRESS NOTES
Called Carolina Staley for on-call urology regarding small blood clots in urine, right sided abd pain and PVR of 210. Rebeka confirmed patient okay to be discharged.

## 2022-10-15 NOTE — PROGRESS NOTES
Patient discharged home with self ride. IV removed. AVS reviewed and all questions answered. Patient felt ready to be discharged.

## 2022-10-15 NOTE — CARE PLAN
The patient is Stable - Low risk of patient condition declining or worsening    Shift Goals  Clinical Goals: monitor CBI; IV fluids; prepare for discharge  Patient Goals: go home today    Progress made toward(s) clinical / shift goals:        Problem: Knowledge Deficit - Standard  Goal: Patient and family/care givers will demonstrate understanding of plan of care, disease process/condition, diagnostic tests and medications  10/14/2022 2023 by Anabela Moran R.N.  Outcome: Met  10/14/2022 2023 by Anabela Moran R.N.  Outcome: Progressing       Patient is not progressing towards the following goals:

## 2022-10-27 LAB — PATHOLOGY CONSULT NOTE: NORMAL

## (undated) DEVICE — LACTATED RINGERS INJ 1000 ML - (14EA/CA 60CA/PF)

## (undated) DEVICE — SYRINGE DISP. 12 CC LL - (100/BX)

## (undated) DEVICE — GOWN SURGICAL X-LARGE ULTRA - FILM-REINFORCED (20/CA)

## (undated) DEVICE — EVACUATOR BLADDER ELLIK - (10/BX)

## (undated) DEVICE — CONTAINER SPECIMEN BAG OR - STERILE 4 OZ W/LID (100EA/CA)

## (undated) DEVICE — GOWN WARMING STANDARD FLEX - (30/CA)

## (undated) DEVICE — FIBER LASER MOSES 550 UM (1/EA)

## (undated) DEVICE — SET EXTENSION WITH 2 PORTS (48EA/CA) ***PART #2C8610 IS A SUBSTITUTE*****

## (undated) DEVICE — PACK SINGLE BASIN - (6/CA)

## (undated) DEVICE — CANISTER SUCTION 3000ML MECHANICAL FILTER AUTO SHUTOFF MEDI-VAC NONSTERILE LF DISP  (40EA/CA)

## (undated) DEVICE — CATHETER URETHRAL OPEN END AXXCESS (10EA/BX)

## (undated) DEVICE — TUBE CONNECT SUCTION CLEAR 120 X 1/4" (50EA/CA)"

## (undated) DEVICE — TOWELS CLOTH SURGICAL - (4/PK 20PK/CA)

## (undated) DEVICE — CONNECTOR HOSE NEPTUNE FOR CYSTO ROOM

## (undated) DEVICE — SPONGE GAUZESTER 4 X 4 4PLY - (128PK/CA)

## (undated) DEVICE — BAG URODRAIN WITH TUBING - (20/CA)

## (undated) DEVICE — SLEEVE, VASO, THIGH, MED

## (undated) DEVICE — COVER FOOT UNIVERSAL DISP. - (25EA/CA)

## (undated) DEVICE — SLEEVE VASO CALF MED - (10PR/CA)

## (undated) DEVICE — PACK CYSTOSCOPY III - (8/CA)

## (undated) DEVICE — GLOVE BIOGEL SZ 7.5 SURGICAL PF LTX - (50PR/BX 4BX/CA)

## (undated) DEVICE — SCOPE DIGITAL URETEROSCOPE DISPOSABLE

## (undated) DEVICE — CATHETER URETHRAL FOLEY SILICONE 20 FR 30 ML 3-WAY

## (undated) DEVICE — TOWEL STOP TIMEOUT SAFETY FLAG (40EA/CA)

## (undated) DEVICE — SODIUM CHL. IRRIGATION 0.9% 3000ML (4EA/CA 65CA/PF)

## (undated) DEVICE — JELLY SURGILUBE STERILE TUBE 4.25 OZ (1/EA)

## (undated) DEVICE — WATER IRRIG. STER 3000 ML - (4/CA)

## (undated) DEVICE — TUBING CLEARLINK DUO-VENT - C-FLO (48EA/CA)

## (undated) DEVICE — SYRINGE 50ML CATHETER TIP (40EA/BX 4BX/CA)

## (undated) DEVICE — SENSOR OXIMETER ADULT SPO2 RD SET (20EA/BX)

## (undated) DEVICE — SET IRRIGATION CYSTOSCOPY Y-TYPE L81 IN (20EA/CA)

## (undated) DEVICE — CATHETER URETHRAL FOLEY SILICONE 22 FR 30 ML 3-WAY

## (undated) DEVICE — GOWN SURGEONS X-LARGE - DISP. (30/CA)

## (undated) DEVICE — SUCTION INSTRUMENT YANKAUER BULBOUS TIP W/O VENT (50EA/CA)

## (undated) DEVICE — SET LEADWIRE 5 LEAD BEDSIDE DISPOSABLE ECG (1SET OF 5/EA)

## (undated) DEVICE — ELECTRODE DUAL RETURN W/ CORD - (50/PK)

## (undated) DEVICE — COVER LIGHT HANDLE ALC PLUS DISP (18EA/BX)

## (undated) DEVICE — MEDICINE CUP STERILE 2 OZ - (100/CA)

## (undated) DEVICE — WIRE GUIDE SENSOR DUAL FLEX - 5/BX

## (undated) DEVICE — SHEATH NAVIGATOR 11/13 X 46 URETERAL ACCESS (5EA/BX)

## (undated) DEVICE — SYRINGE 30 ML LL (56/BX)